# Patient Record
Sex: MALE | URBAN - METROPOLITAN AREA
[De-identification: names, ages, dates, MRNs, and addresses within clinical notes are randomized per-mention and may not be internally consistent; named-entity substitution may affect disease eponyms.]

---

## 2020-04-01 ENCOUNTER — HOSPITAL ENCOUNTER (OUTPATIENT)
Dept: MEDSURG UNIT | Facility: HOSPITAL | Age: 50
End: 2020-04-03
Attending: SURGERY | Admitting: SURGERY

## 2020-04-01 LAB
ABS NEUT (OLG): 19.45 X10(3)/MCL (ref 2.1–9.2)
ALBUMIN SERPL-MCNC: 4.1 GM/DL (ref 3.4–5)
ALBUMIN/GLOB SERPL: 1.1 RATIO (ref 1.1–2)
ALP SERPL-CCNC: 105 UNIT/L (ref 45–117)
ALT SERPL-CCNC: 44 UNIT/L (ref 12–78)
AMPHET UR QL SCN: NEGATIVE
AST SERPL-CCNC: 13 UNIT/L (ref 15–37)
BARBITURATE SCN PRESENT UR: NEGATIVE
BASOPHILS # BLD AUTO: 0 X10(3)/MCL (ref 0–0.2)
BASOPHILS NFR BLD AUTO: 0 %
BENZODIAZ UR QL SCN: NEGATIVE
BILIRUB SERPL-MCNC: 0.4 MG/DL (ref 0.2–1)
BILIRUBIN DIRECT+TOT PNL SERPL-MCNC: 0.1 MG/DL (ref 0–0.2)
BILIRUBIN DIRECT+TOT PNL SERPL-MCNC: 0.3 MG/DL
BUN SERPL-MCNC: 20 MG/DL (ref 7–18)
CALCIUM SERPL-MCNC: 9.6 MG/DL (ref 8.5–10.1)
CANNABINOIDS UR QL SCN: NEGATIVE
CHLORIDE SERPL-SCNC: 107 MMOL/L (ref 98–107)
CO2 SERPL-SCNC: 26 MMOL/L (ref 21–32)
COCAINE UR QL SCN: NEGATIVE
CREAT SERPL-MCNC: 1 MG/DL (ref 0.6–1.3)
ERYTHROCYTE [DISTWIDTH] IN BLOOD BY AUTOMATED COUNT: 12.6 % (ref 11.5–14.5)
ETHANOL SERPL-MCNC: <3 MG/DL
GLOBULIN SER-MCNC: 3.7 GM/ML (ref 2.3–3.5)
GLUCOSE SERPL-MCNC: 133 MG/DL (ref 74–106)
HCT VFR BLD AUTO: 50.1 % (ref 40–51)
HGB BLD-MCNC: 16.9 GM/DL (ref 13.5–17.5)
IMM GRANULOCYTES # BLD AUTO: 0.13 10*3/UL
IMM GRANULOCYTES NFR BLD AUTO: 1 %
LIPASE SERPL-CCNC: 54 UNIT/L (ref 73–393)
LYMPHOCYTES # BLD AUTO: 1.2 X10(3)/MCL (ref 0.6–4.6)
LYMPHOCYTES NFR BLD AUTO: 6 %
MCH RBC QN AUTO: 30.6 PG (ref 26–34)
MCHC RBC AUTO-ENTMCNC: 33.7 GM/DL (ref 31–37)
MCV RBC AUTO: 90.8 FL (ref 80–100)
MONOCYTES # BLD AUTO: 0.5 X10(3)/MCL (ref 0.1–1.3)
MONOCYTES NFR BLD AUTO: 2 %
NEUTROPHILS # BLD AUTO: 19.45 X10(3)/MCL (ref 2.1–9.2)
NEUTROPHILS NFR BLD AUTO: 91 %
OPIATES UR QL SCN: NEGATIVE
PCP UR QL: NEGATIVE
PH UR STRIP.AUTO: 6 [PH] (ref 5–8)
PLATELET # BLD AUTO: 309 X10(3)/MCL (ref 130–400)
PMV BLD AUTO: 10.6 FL (ref 7.4–10.4)
POTASSIUM SERPL-SCNC: 4.7 MMOL/L (ref 3.5–5.1)
PROT SERPL-MCNC: 7.8 GM/DL (ref 6.4–8.2)
RBC # BLD AUTO: 5.52 X10(6)/MCL (ref 4.5–5.9)
SODIUM SERPL-SCNC: 139 MMOL/L (ref 136–145)
TEMPERATURE, URINE (OHS): 21.8 DEGC (ref 20–25)
WBC # SPEC AUTO: 21.3 X10(3)/MCL (ref 4.5–11)

## 2020-04-02 LAB
ABS NEUT (OLG): 13.52 X10(3)/MCL (ref 2.1–9.2)
BASOPHILS # BLD AUTO: 0 X10(3)/MCL (ref 0–0.2)
BASOPHILS NFR BLD AUTO: 0 %
BUN SERPL-MCNC: 16 MG/DL (ref 7–18)
CALCIUM SERPL-MCNC: 8.3 MG/DL (ref 8.5–10.1)
CHLORIDE SERPL-SCNC: 109 MMOL/L (ref 98–107)
CO2 SERPL-SCNC: 26 MMOL/L (ref 21–32)
CREAT SERPL-MCNC: 1 MG/DL (ref 0.6–1.3)
CREAT/UREA NIT SERPL: 16
EOSINOPHIL # BLD AUTO: 0.1 X10(3)/MCL (ref 0–0.9)
EOSINOPHIL NFR BLD AUTO: 0 %
ERYTHROCYTE [DISTWIDTH] IN BLOOD BY AUTOMATED COUNT: 12.7 % (ref 11.5–14.5)
GLUCOSE SERPL-MCNC: 101 MG/DL (ref 74–106)
HCT VFR BLD AUTO: 43.7 % (ref 40–51)
HGB BLD-MCNC: 14.6 GM/DL (ref 13.5–17.5)
IMM GRANULOCYTES # BLD AUTO: 0.08 10*3/UL
IMM GRANULOCYTES NFR BLD AUTO: 0 %
LYMPHOCYTES # BLD AUTO: 3.7 X10(3)/MCL (ref 0.6–4.6)
LYMPHOCYTES NFR BLD AUTO: 19 %
MCH RBC QN AUTO: 30.1 PG (ref 26–34)
MCHC RBC AUTO-ENTMCNC: 33.4 GM/DL (ref 31–37)
MCV RBC AUTO: 90.1 FL (ref 80–100)
MONOCYTES # BLD AUTO: 1.5 X10(3)/MCL (ref 0.1–1.3)
MONOCYTES NFR BLD AUTO: 8 %
NEUTROPHILS # BLD AUTO: 13.52 X10(3)/MCL (ref 2.1–9.2)
NEUTROPHILS NFR BLD AUTO: 72 %
PLATELET # BLD AUTO: 250 X10(3)/MCL (ref 130–400)
PMV BLD AUTO: 10.9 FL (ref 7.4–10.4)
POTASSIUM SERPL-SCNC: 3.8 MMOL/L (ref 3.5–5.1)
RBC # BLD AUTO: 4.85 X10(6)/MCL (ref 4.5–5.9)
SODIUM SERPL-SCNC: 140 MMOL/L (ref 136–145)
WBC # SPEC AUTO: 18.9 X10(3)/MCL (ref 4.5–11)

## 2020-04-03 LAB
ABS NEUT (OLG): 7.71 X10(3)/MCL (ref 2.1–9.2)
ALBUMIN SERPL-MCNC: 2.8 GM/DL (ref 3.4–5)
ALBUMIN/GLOB SERPL: 0.8 RATIO (ref 1.1–2)
ALP SERPL-CCNC: 78 UNIT/L (ref 45–117)
ALT SERPL-CCNC: 25 UNIT/L (ref 12–78)
AST SERPL-CCNC: 11 UNIT/L (ref 15–37)
BASOPHILS # BLD AUTO: 0 X10(3)/MCL (ref 0–0.2)
BASOPHILS NFR BLD AUTO: 0 %
BILIRUB SERPL-MCNC: 0.5 MG/DL (ref 0.2–1)
BILIRUBIN DIRECT+TOT PNL SERPL-MCNC: 0.2 MG/DL (ref 0–0.2)
BILIRUBIN DIRECT+TOT PNL SERPL-MCNC: 0.3 MG/DL
BUN SERPL-MCNC: 18 MG/DL (ref 7–18)
CALCIUM SERPL-MCNC: 8.1 MG/DL (ref 8.5–10.1)
CHLORIDE SERPL-SCNC: 110 MMOL/L (ref 98–107)
CO2 SERPL-SCNC: 28 MMOL/L (ref 21–32)
CREAT SERPL-MCNC: 1 MG/DL (ref 0.6–1.3)
EOSINOPHIL # BLD AUTO: 0.1 X10(3)/MCL (ref 0–0.9)
EOSINOPHIL NFR BLD AUTO: 1 %
ERYTHROCYTE [DISTWIDTH] IN BLOOD BY AUTOMATED COUNT: 13.2 % (ref 11.5–14.5)
GLOBULIN SER-MCNC: 3.3 GM/ML (ref 2.3–3.5)
GLUCOSE SERPL-MCNC: 97 MG/DL (ref 74–106)
HCT VFR BLD AUTO: 41.6 % (ref 40–51)
HGB BLD-MCNC: 13.4 GM/DL (ref 13.5–17.5)
IMM GRANULOCYTES # BLD AUTO: 0.04 10*3/UL
IMM GRANULOCYTES NFR BLD AUTO: 0 %
LYMPHOCYTES # BLD AUTO: 2.1 X10(3)/MCL (ref 0.6–4.6)
LYMPHOCYTES NFR BLD AUTO: 19 %
MCH RBC QN AUTO: 30.3 PG (ref 26–34)
MCHC RBC AUTO-ENTMCNC: 32.2 GM/DL (ref 31–37)
MCV RBC AUTO: 94.1 FL (ref 80–100)
MONOCYTES # BLD AUTO: 0.9 X10(3)/MCL (ref 0.1–1.3)
MONOCYTES NFR BLD AUTO: 8 %
NEUTROPHILS # BLD AUTO: 7.71 X10(3)/MCL (ref 2.1–9.2)
NEUTROPHILS NFR BLD AUTO: 71 %
PLATELET # BLD AUTO: 235 X10(3)/MCL (ref 130–400)
PMV BLD AUTO: 10.4 FL (ref 7.4–10.4)
POTASSIUM SERPL-SCNC: 4.1 MMOL/L (ref 3.5–5.1)
PROT SERPL-MCNC: 6.1 GM/DL (ref 6.4–8.2)
RBC # BLD AUTO: 4.42 X10(6)/MCL (ref 4.5–5.9)
SODIUM SERPL-SCNC: 142 MMOL/L (ref 136–145)
WBC # SPEC AUTO: 10.9 X10(3)/MCL (ref 4.5–11)

## 2022-04-30 NOTE — ED PROVIDER NOTES
Patient:   Scotty Contreras             MRN: [id number]            FIN: [id number]               Age:   50 years     Sex:  Male     :  1970   Associated Diagnoses:   Acute appendicitis   Author:   Ava GOOD, Kali Fallon      Basic Information   Time seen: Date & time 2020 13:08:00.   History source: Patient.   Arrival mode: Private vehicle.   History limitation: None.   Additional information: Chief Complaint from Nursing Triage Note : Chief Complaint   2020 12:02 CDT       Chief Complaint           Pt c/o upper abd pain with nausea/vomiting since this AM - pt states he thinks he ate something bad  .      History of Present Illness   The patient presents with abdominal pain.  He is here for upper mid abdominal pain since this morning, mild nausea, vomited one time.  Thinks it might be related to some Mexican food he ate by himself yesterday, he has a nonspecific concern about that food.  Denies any alcohol, does smoke cigarettes, denies any significant past medical or surgical history.  Took some aspirin and Advil which did not help the pain.  No urinary symptoms or diarrhea.  No back pain, chest pain, dyspnea, fever, chills, sweats, cough, sputum production, or other complaints.  Has not been around anyone else with similar symptoms and has not been exposed that he knows of to coronavirus.  Works in construction, last worked about 4 days ago.  On arrival, no distress, normal exam and vital signs..        Review of Systems   Constitutional symptoms:  Negative except as documented in HPI, no fever, no chills, no weakness.    Skin symptoms:  Negative except as documented in HPI, no rash, no breakdown.    Eye symptoms:  Negative except as documented in HPI, no recent vision problems, no pain.    ENMT symptoms:  Negative except as documented in HPI, no ear pain, no sore throat.    Respiratory symptoms:  Negative except as documented in HPI, no shortness of breath, no cough, no wheezing.     Cardiovascular symptoms:  Negative except as documented in HPI, no chest pain, no palpitations, no syncope.    Gastrointestinal symptoms:  Abdominal pain, nausea, vomiting, See HPI, No diarrhea,    Genitourinary symptoms:  Negative except as documented in HPI, no dysuria, no hematuria.    Musculoskeletal symptoms:  Negative except as documented in HPI, no Muscle pain, no Joint pain.    Neurologic symptoms:  Negative except as documented in HPI, no altered level of consciousness, no weakness.    Psychiatric symptoms:  Negative except as documented in HPI, no anxiety, no depression.    Endocrine symptoms:  Negative except as documented in HPI, no polyuria, no polydipsia.    Hematologic/Lymphatic symptoms:  Negative except as documented in HPI, bleeding tendency negative, bruising tendency negative.    Allergy/immunologic symptoms:  Negative except as documented in HPI, no recurrent infections, no impaired immunity.              Additional review of systems information: All other systems reviewed and otherwise negative, All systems reviewed as documented in chart.      Health Status   Allergies:    Allergic Reactions (Selected)  No Known Allergies,    Allergies (1) Active Reaction  No Known Allergies None Documented  .      Past Medical/ Family/ Social History   Medical history:    No active or resolved past medical history items have been selected or recorded..   Surgical history:    No active procedure history items have been selected or recorded..   Family history:    No family history items have been selected or recorded..   Social history:    Social & Psychosocial Habits    Alcohol  09/19/2014 Risk Assessment: Low Risk    Tobacco  09/19/2014 Risk Assessment: Denies Tobacco Use    04/01/2020  Use: 4 or less cigarettes(less    Patient Wants Consult For Cessation Counseling No    Abuse/Neglect  04/01/2020  SHX Any signs of abuse or neglect No  .   Problem list:    Active Problems (1)  No Chronic Problems   .       Physical Examination               Vital Signs   Vital Signs   4/1/2020 12:24 CDT       Oxygen Therapy            Room air    4/1/2020 12:02 CDT       Temperature Oral          36.5 DegC                             Temperature Oral (calculated)             97.70 DegF                             Peripheral Pulse Rate     57 bpm  LOW                             Respiratory Rate          21 br/min                             SpO2                      98 %                             Oxygen Therapy            Room air                             Systolic Blood Pressure   128 mmHg                             Diastolic Blood Pressure  80 mmHg  .      Vital Signs (last 24 hrs)_____  Last Charted___________  Temp Oral     36.5 DegC  (APR 01 12:02)  Heart Rate Peripheral   L 57bpm  (APR 01 12:02)  Resp Rate         21 br/min  (APR 01 12:02)  SBP      128 mmHg  (APR 01 12:02)  DBP      80 mmHg  (APR 01 12:02)  SpO2      98 %  (APR 01 12:02)  Weight      90.7 kg  (APR 01 12:02)  Height      178 cm  (APR 01 12:02)  BMI      28.63  (APR 01 12:02)  .   Measurements   4/1/2020 12:02 CDT       Weight Dosing             90.7 kg                             Weight Measured           90.7 kg                             Weight Measured and Calculated in Lbs     199.96 lb                             Height/Length Dosing      178 cm                             Height/Length Measured    178 cm                             Body Mass Index Measured  28.63 kg/m2  .   Basic Oxygen Information   4/1/2020 12:24 CDT       Oxygen Therapy            Room air    4/1/2020 12:02 CDT       SpO2                      98 %                             Oxygen Therapy            Room air  .   General:  Alert, no acute distress.    Skin:  Warm, dry, normal for ethnicity.    Head:  Normocephalic, atraumatic.    Neck:  Supple, trachea midline, no tenderness.    Eye:  Pupils are equal, round and reactive to light, extraocular movements are intact, normal  conjunctiva.    Ears, nose, mouth and throat:  Oral mucosa moist, no pharyngeal erythema or exudate.    Cardiovascular:  Regular rate and rhythm, No murmur, Normal peripheral perfusion, No edema.    Respiratory:  Lungs are clear to auscultation, respirations are non-labored, breath sounds are equal, Symmetrical chest wall expansion.    Chest wall:  No tenderness, No deformity.    Back:  Nontender, Normal range of motion, Normal alignment.    Musculoskeletal:  Normal ROM, normal strength, no tenderness, no swelling, no deformity.    Gastrointestinal:  Soft, Non distended, Normal bowel sounds, Minimal upper mid abdominal tenderness to palpation, not recorded reproducible, no rebound or guarding..    Neurological:  Alert and oriented to person, place, time, and situation, No focal neurological deficit observed, CN II-XII intact, normal motor observed, normal speech observed.    Lymphatics:  No lymphadenopathy.   Psychiatric:  Cooperative, appropriate mood & affect, normal judgment.       Medical Decision Making   Differential Diagnosis:  Abdominal pain, Appendicitis, biliary colic, cholecystitis, hepatitis, pancreatitis, gastroesophageal reflux disease, gastroenteritis, peptic ulcer disease, gastritis.    Documents reviewed:  Emergency department nurses' notes, emergency department records, prior records.    Results review:  Lab results : Lab View   4/1/2020 13:45 CDT       Sodium Lvl                139 mmol/L                             Potassium Lvl             4.7 mmol/L                             Chloride                  107 mmol/L                             CO2                       26 mmol/L                             Calcium Lvl               9.6 mg/dL                             Glucose Lvl               133 mg/dL  HI                             BUN                       20 mg/dL  HI                             Creatinine                1.00 mg/dL                             eGFR-AA                   103  mL/min                             eGFR-[place]                  85 mL/min  LOW                             Bili Total                0.4 mg/dL                             Bili Direct               0.1 mg/dL                             Bili Indirect             0.3 mg/dL                             AST                       13 unit/L  LOW                             ALT                       44 unit/L                             Alk Phos                  105 unit/L                             Total Protein             7.8 gm/dL                             Albumin Lvl               4.1 gm/dL                             Globulin                  3.70 gm/mL  HI                             A/G Ratio                 1.1 ratio                             Lipase Lvl                54 unit/L  LOW                             WBC                       21.3 x10(3)/mcL  HI                             RBC                       5.52 x10(6)/mcL                             Hgb                       16.9 gm/dL                             Hct                       50.1 %                             Platelet                  309 x10(3)/mcL                             MCV                       90.8 fL                             MCH                       30.6 pg                             MCHC                      33.7 gm/dL                             RDW                       12.6 %                             MPV                       10.6 fL  HI                             Abs Neut                  19.45 x10(3)/mcL  HI                             Neutro Auto               91 %  NA                             Lymph Auto                6 %  NA                             Mono Auto                 2 %  NA                             Basophil Auto             0 %  NA                             Abs Neutro                19.45 x10(3)/mcL  HI                             Abs Lymph                 1.2 x10(3)/mcL                              Abs Mono                  0.5 x10(3)/mcL                             Abs Baso                  0.0 x10(3)/mcL                             IG%                       1 %  NA                             IG#                       0.1300  NA                             Ethanol Lvl               <3.0 mg/dL    4/1/2020 12:55 CDT       U pH                      6.0                             U Temp                    21.8 DegC                             U Amph Scr                Negative                             U Nikki Scr                Negative                             U Benzodia Scr            Negative                             U Cannab Scr              Negative                             U Cocaine Scr             Negative                             U Opiate Scr              Negative                             U Phencyclidine Scr       Negative  .   Radiology results:  Rad Results (ST)  < 12 hrs   Accession: [id number]  Order: CT Abdomen and Pelvis W/O Contrast  Report Dt/Tm: 04/01/2020 16:19  Report:      INDICATION: Abdominal Pain with nausea and vomiting.     TECHNIQUE: Multidetector axial images were obtained from the   diaphragms to below symphysis pubis without the administration of IV  contrast. Oral contrast was not administered.     Dose length product of 472 mGycm. Automated exposure control was  utilized to minimize radiation dose.     Comparison: None available..     FINDINGS:     Included lungs show dependent hypoventilatory changes without acute  air space infiltrates or fluid within the pleural spaces.      Within limitations of noncontrast technique, no acute findings of the  liver, pancreas and spleen identified. Gallbladder wall is not  thickened and there is no intraluminal calcified calculus.  No  apparent biliary dilation.  The adrenal glands noncontrast evaluation  is unremarkable. The kidneys are unremarkable in size and contour.  There is no hydronephrosis or nephrolithiasis. The  ureters appear  normal in course and diameter without intra ureteral stone.     Stomach contains small amount of fluid. There is no significant  dilatation of the small bowel loops or transition point. Appendix is  dilated up to 1.4 cm and is seen on Coronal images 60-65 series 601.  Appendix contains proximal and distal hyperdensities which may  represent small appendicoliths. No significant periappendiceal  inflammations on this noncontrast exam. Colon is nondistended and is  remarkable for scattered noninflamed diverticulosis coli. Portion of  the sigmoid colon is narrowed on image 81 series 2 which may be due to  transient contraction though other etiologies are without exclusion.  No free air or free fluid.     Urinary bladder appears within normal limits. No intravesical stone  identified. Prostate is not enlarged and contains few calcifications.  Bilateral fat-containing inguinal hernias..     IMPRESSION:     Dilated appendix with proximal and distal small appendicoliths. No  significant periappendiceal inflammations within limitations of  noncontrast technique. Early developing appendicitis is without  exclusion. Please correlate clinically.     Findings were discussed with Dr. Escalante April 1, 2020 at 1625 hours.      .      Reexamination/ Reevaluation   Time: 4/1/2020 15:48:00 .   Vital signs   results included from flowsheet : Vital Signs   4/1/2020 15:00 CDT       Temperature Oral          36 DegC                             Temperature Oral (calculated)             96.80 DegF                             Peripheral Pulse Rate     60 bpm                             SpO2                      100 %                             Systolic Blood Pressure   130 mmHg                             Diastolic Blood Pressure  75 mmHg    4/1/2020 14:00 CDT       Temperature Oral          36.5 DegC                             Temperature Oral (calculated)             97.70 DegF                             Peripheral Pulse  Rate     58 bpm  LOW                             SpO2                      100 %                             Systolic Blood Pressure   125 mmHg                             Diastolic Blood Pressure  70 mmHg    4/1/2020 12:24 CDT       Oxygen Therapy            Room air    4/1/2020 12:02 CDT       Temperature Oral          36.5 DegC                             Temperature Oral (calculated)             97.70 DegF                             Peripheral Pulse Rate     57 bpm  LOW                             Respiratory Rate          21 br/min                             SpO2                      98 %                             Oxygen Therapy            Room air                             Systolic Blood Pressure   128 mmHg                             Diastolic Blood Pressure  80 mmHg     Notes: States that he feels a little better.  Results reviewed.  Still has some mild and nonspecific upper epigastric tenderness, no definite rebound, guarding, or other signs of peritoneal irritation.  He does not appear sick or toxic, but I cannot fully explain his symptoms and leukocytosis based on current data.  Will add CT..      Impression and Plan   Diagnosis   Acute appendicitis ([id number] K35.80)      Calls-Consults   -  4/1/2020 16:38:00 , Discussed with Gen. Surg. on call - will see in ER..    Plan   Disposition: Admit time  4/1/2020 18:02:00, Place in Observation Unit, Gen. Surg..